# Patient Record
Sex: FEMALE | Race: AMERICAN INDIAN OR ALASKA NATIVE
[De-identification: names, ages, dates, MRNs, and addresses within clinical notes are randomized per-mention and may not be internally consistent; named-entity substitution may affect disease eponyms.]

---

## 2017-09-22 ENCOUNTER — HOSPITAL ENCOUNTER (EMERGENCY)
Dept: HOSPITAL 5 - ED | Age: 27
Discharge: HOME | End: 2017-09-22
Payer: MEDICAID

## 2017-09-22 VITALS — DIASTOLIC BLOOD PRESSURE: 62 MMHG | SYSTOLIC BLOOD PRESSURE: 110 MMHG

## 2017-09-22 DIAGNOSIS — O23.42: ICD-10-CM

## 2017-09-22 DIAGNOSIS — O21.0: Primary | ICD-10-CM

## 2017-09-22 DIAGNOSIS — Z3A.14: ICD-10-CM

## 2017-09-22 LAB
ALBUMIN SERPL-MCNC: 3.9 G/DL (ref 3.9–5)
ALBUMIN/GLOB SERPL: 1.1 %
ALP SERPL-CCNC: 68 UNITS/L (ref 35–129)
ALT SERPL-CCNC: 11 UNITS/L (ref 7–56)
ANION GAP SERPL CALC-SCNC: 21 MMOL/L
BASOPHILS NFR BLD AUTO: 0.6 % (ref 0–1.8)
BILIRUB SERPL-MCNC: 0.4 MG/DL (ref 0.1–1.2)
BILIRUB UR QL STRIP: (no result)
BLOOD UR QL VISUAL: (no result)
BUN SERPL-MCNC: 7 MG/DL (ref 7–17)
BUN/CREAT SERPL: 23.33 %
CALCIUM SERPL-MCNC: 9.2 MG/DL (ref 8.4–10.2)
CHLORIDE SERPL-SCNC: 101.1 MMOL/L (ref 98–107)
CO2 SERPL-SCNC: 18 MMOL/L (ref 22–30)
EOSINOPHIL NFR BLD AUTO: 1 % (ref 0–4.3)
GLUCOSE SERPL-MCNC: 77 MG/DL (ref 65–100)
HCT VFR BLD CALC: 38.4 % (ref 30.3–42.9)
HGB BLD-MCNC: 13.5 GM/DL (ref 10.1–14.3)
KETONES UR STRIP-MCNC: 80 MG/DL
LEUKOCYTE ESTERASE UR QL STRIP: (no result)
LIPASE SERPL-CCNC: 34 UNITS/L (ref 13–60)
MCH RBC QN AUTO: 32 PG (ref 28–32)
MCHC RBC AUTO-ENTMCNC: 35 % (ref 30–34)
MCV RBC AUTO: 92 FL (ref 79–97)
MUCOUS THREADS #/AREA URNS HPF: (no result) /HPF
NITRITE UR QL STRIP: (no result)
PH UR STRIP: 6 [PH] (ref 5–7)
PLATELET # BLD: 178 K/MM3 (ref 140–440)
POTASSIUM SERPL-SCNC: 3.8 MMOL/L (ref 3.6–5)
PROT SERPL-MCNC: 7.3 G/DL (ref 6.3–8.2)
RBC # BLD AUTO: 4.17 M/MM3 (ref 3.65–5.03)
RBC #/AREA URNS HPF: 30 /HPF (ref 0–6)
SODIUM SERPL-SCNC: 136 MMOL/L (ref 137–145)
UROBILINOGEN UR-MCNC: 2 MG/DL (ref ?–2)
WBC # BLD AUTO: 6.6 K/MM3 (ref 4.5–11)
WBC #/AREA URNS HPF: 81 /HPF (ref 0–6)

## 2017-09-22 PROCEDURE — 36415 COLL VENOUS BLD VENIPUNCTURE: CPT

## 2017-09-22 PROCEDURE — 87086 URINE CULTURE/COLONY COUNT: CPT

## 2017-09-22 PROCEDURE — 96361 HYDRATE IV INFUSION ADD-ON: CPT

## 2017-09-22 PROCEDURE — 80053 COMPREHEN METABOLIC PANEL: CPT

## 2017-09-22 PROCEDURE — 96375 TX/PRO/DX INJ NEW DRUG ADDON: CPT

## 2017-09-22 PROCEDURE — 81001 URINALYSIS AUTO W/SCOPE: CPT

## 2017-09-22 PROCEDURE — 96365 THER/PROPH/DIAG IV INF INIT: CPT

## 2017-09-22 PROCEDURE — 93010 ELECTROCARDIOGRAM REPORT: CPT

## 2017-09-22 PROCEDURE — 76805 OB US >/= 14 WKS SNGL FETUS: CPT

## 2017-09-22 PROCEDURE — 85025 COMPLETE CBC W/AUTO DIFF WBC: CPT

## 2017-09-22 PROCEDURE — 84702 CHORIONIC GONADOTROPIN TEST: CPT

## 2017-09-22 PROCEDURE — 83690 ASSAY OF LIPASE: CPT

## 2017-09-22 PROCEDURE — 99284 EMERGENCY DEPT VISIT MOD MDM: CPT

## 2017-09-22 PROCEDURE — 93005 ELECTROCARDIOGRAM TRACING: CPT

## 2017-09-22 NOTE — EMERGENCY DEPARTMENT REPORT
HPI





- General


Chief Complaint: Nausea/Vomiting/Diarrhea


Time Seen by Provider: 09/22/17 07:39





- HPI


HPI: 





Room 25





The patient is a 26-year-old female presenting with the chief complaint of 

nausea and vomiting.  The patient states past 3 days she has been vomiting and 

unable to keep anything down.  Patient is approximately 14 weeks' gestational 

age who states she's had lower abdominal pain since yesterday.  Patient denies 

fever, diarrhea or vaginal bleeding.





Location: The gastrointestinal system


Duration: 3 days


Quality: Nausea, pain


Severity: Moderate


Modifying factors: [see above]


Context: [see above]


Mode of transportation: Unknown





ED Past Medical Hx





- Past Medical History


Hx Renal Disease: Yes (kidney stones)


Hx Kidney Stones: Yes


Hx Psychiatric Treatment: Yes (depression)





- Surgical History


Additional Surgical History: pt denies





- Family History


Family history: no significant





- Social History


Smoking Status: Never Smoker


Substance Use Type: None





- Medications


Home Medications: 


 Home Medications











 Medication  Instructions  Recorded  Confirmed  Last Taken  Type


 


Acetaminophen/Codeine [Tylenol #3] 1 tab PO Q6H PRN #20 tab 10/23/15  Unknown Rx


 


Mupirocin [Bactroban 2%] 1 applic TP TID #1 tube 10/23/15  Unknown Rx


 


Sertraline [Zoloft] 50 mg PO QDAY 10/23/15 10/23/15 10/16/15 History


 


Sulfamethoxazole/Trimethoprim 1 each PO BID #20 tablet 10/23/15  Unknown Rx





[Bactrim DS TAB]     


 


medroxyPROGESTERone ACETATE 150 mg IM Q3M 10/23/15 10/23/15 08/13/15 History





[Depo-Provera (Contraception)]     


 


Nitrofurantoin Mono/M-Cryst 100 mg PO Q12HR #20 capsule 09/22/17  Unknown Rx





[Macrobid CAP]     


 


Ondansetron [Zofran ODT TAB] 8 mg PO Q8HR #20 tab.rapdis 09/22/17  Unknown Rx














ED Review of Systems


ROS: 


Stated complaint: DEHYDRATION, VOIMITING, 14 WEEKS


Other details as noted in HPI





Comment: All other systems reviewed and negative


Constitutional: denies: chills, fever


Eyes: denies: eye pain, eye discharge, vision change


ENT: denies: ear pain, throat pain


Respiratory: denies: cough, shortness of breath, wheezing


Cardiovascular: denies: chest pain, palpitations


Endocrine: no symptoms reported


Gastrointestinal: abdominal pain, nausea, vomiting.  denies: diarrhea


Genitourinary: denies: urgency, dysuria, discharge


Musculoskeletal: back pain


Skin: denies: rash, lesions


Neurological: denies: headache, weakness, paresthesias


Psychiatric: denies: anxiety, depression


Hematological/Lymphatic: denies: easy bleeding, easy bruising





Physical Exam





- Physical Exam


Vital Signs: 


 Vital Signs











  09/22/17 09/22/17





  06:20 06:21


 


Temperature 98.5 F 98.5 F


 


Pulse Rate 100 H 100 H


 


Respiratory 18 





Rate  


 


Blood Pressure 106/66 106/66


 


O2 Sat by Pulse 96 96





Oximetry  











Physical Exam: 





GENERAL: The patient is well-developed well-nourished female lying on a 

stretcher appearing to be in moderate discomfort. []


HEENT: Normocephalic.  Atraumatic.  Extraocular motions are intact.


NECK: Supple.  No meningitic signs are noted.  There is no adenopathy noted.


CHEST/LUNGS: Clear to auscultation.  There is no respiratory distress noted.


HEART/CARDIOVASCULAR: Regular.  There is no tachycardia.  There is no gallop 

rub or murmur.


ABDOMEN: Abdomen is soft, nontender.  Patient has normal bowel sounds.  There 

is no abdominal distention.


SKIN: There is no rash.  There is no edema.  There is no diaphoresis.


NEURO: The patient is awake, alert, and oriented.  The patient is cooperative. 

The patient has normal speech


MUSCULOSKELETAL: There is no evidence of acute injury.





ED Course


 Vital Signs











  09/22/17 09/22/17





  06:20 06:21


 


Temperature 98.5 F 98.5 F


 


Pulse Rate 100 H 100 H


 


Respiratory 18 





Rate  


 


Blood Pressure 106/66 106/66


 


O2 Sat by Pulse 96 96





Oximetry  














- Reevaluation(s)


Reevaluation #1: 





09/22/17 10:04


Patient tolerating po 





ED Medical Decision Making





- Lab Data


Result diagrams: 


 09/22/17 06:46





 09/22/17 06:46





 Laboratory Tests











  09/22/17 09/22/17 09/22/17





  06:46 06:46 07:55


 


WBC  6.6  


 


RBC  4.17  


 


Hgb  13.5  


 


Hct  38.4  


 


MCV  92  


 


MCH  32  


 


MCHC  35 H  


 


RDW  13.3  


 


Plt Count  178  


 


Lymph % (Auto)  19.5  


 


Mono % (Auto)  7.7 H  


 


Eos % (Auto)  1.0  


 


Baso % (Auto)  0.6  


 


Lymph #  1.3  


 


Mono #  0.5  


 


Eos #  0.1  


 


Baso #  0.0  


 


Seg Neutrophils %  71.2 H  


 


Seg Neutrophils #  4.7  


 


Sodium   136 L 


 


Potassium   3.8 


 


Chloride   101.1 


 


Carbon Dioxide   18 L 


 


Anion Gap   21 


 


BUN   7 


 


Creatinine   0.3 L 


 


Estimated GFR   > 60 


 


BUN/Creatinine Ratio   23.33 


 


Glucose   77 


 


Calcium   9.2 


 


Total Bilirubin   0.40 


 


AST   13 


 


ALT   11 


 


Alkaline Phosphatase   68 


 


Total Protein   7.3 


 


Albumin   3.9 


 


Albumin/Globulin Ratio   1.1 


 


Lipase   34 


 


HCG, Quant    43564 H


 


Urine Color   


 


Urine Turbidity   


 


Urine pH   


 


Ur Specific Gravity   


 


Urine Protein   


 


Urine Glucose (UA)   


 


Urine Ketones   


 


Urine Blood   


 


Urine Nitrite   


 


Urine Bilirubin   


 


Urine Urobilinogen   


 


Ur Leukocyte Esterase   


 


Urine WBC (Auto)   


 


Urine RBC (Auto)   


 


U Epithel Cells (Auto)   


 


Urine Mucus   














  09/22/17





  08:06


 


WBC 


 


RBC 


 


Hgb 


 


Hct 


 


MCV 


 


MCH 


 


MCHC 


 


RDW 


 


Plt Count 


 


Lymph % (Auto) 


 


Mono % (Auto) 


 


Eos % (Auto) 


 


Baso % (Auto) 


 


Lymph # 


 


Mono # 


 


Eos # 


 


Baso # 


 


Seg Neutrophils % 


 


Seg Neutrophils # 


 


Sodium 


 


Potassium 


 


Chloride 


 


Carbon Dioxide 


 


Anion Gap 


 


BUN 


 


Creatinine 


 


Estimated GFR 


 


BUN/Creatinine Ratio 


 


Glucose 


 


Calcium 


 


Total Bilirubin 


 


AST 


 


ALT 


 


Alkaline Phosphatase 


 


Total Protein 


 


Albumin 


 


Albumin/Globulin Ratio 


 


Lipase 


 


HCG, Quant 


 


Urine Color  [place]


 


Urine Turbidity  Cloudy


 


Urine pH  6.0


 


Ur Specific Gravity  1.028


 


Urine Protein  100 mg/dl


 


Urine Glucose (UA)  Neg


 


Urine Ketones  80


 


Urine Blood  Neg


 


Urine Nitrite  Neg


 


Urine Bilirubin  Neg


 


Urine Urobilinogen  2.0


 


Ur Leukocyte Esterase  Mod


 


Urine WBC (Auto)  81.0 H


 


Urine RBC (Auto)  30.0


 


U Epithel Cells (Auto)  80.0 H


 


Urine Mucus  3+














- EKG Data


-: EKG Interpreted by Me


EKG shows normal: sinus rhythm


Rate: normal





- EKG Data


When compared to previous EKG there are: no significant change


Interpretation: unchanged when compared t (06/21/2016)





- Radiology Data


Radiology results: report reviewed (pelvic ultrasound), image reviewed (pelvic 

ultrasound)





Pelvic ultrasound (read by radiologist)-single viable intrauterine gestation 

with ultrasound estimated age of 14 weeks and 5 days.





- Differential Diagnosis


hyperemesis gravidarum, UTI


Critical care attestation.: 


If time is entered above; I have spent that time in minutes in the direct care 

of this critically ill patient, excluding procedure time.








ED Disposition


Clinical Impression: 


 Hyperemesis gravidarum, UTI (urinary tract infection)





Disposition: DC-01 TO HOME OR SELFCARE


Is pt being admited?: No


Does the pt Need Aspirin: No


Condition: Stable


Instructions:  Hyperemesis Gravidarum (ED)


Additional Instructions: 


Return to the emergency department immediately should you develop worsening 

symptoms, fever, inability to tolerate food or liquid or any other concerns.


Prescriptions: 


Nitrofurantoin Mono/M-Cryst [Macrobid CAP] 100 mg PO Q12HR #20 capsule


Ondansetron [Zofran ODT TAB] 8 mg PO Q8HR #20 tab.rapdis


Referrals: 


MÓNICA JACOBO MD [Staff Physician] - 3-5 Days


Time of Disposition: 10:07

## 2018-02-22 ENCOUNTER — HOSPITAL ENCOUNTER (INPATIENT)
Dept: HOSPITAL 5 - TRG | Age: 28
LOS: 2 days | Discharge: HOME | End: 2018-02-24
Attending: OBSTETRICS & GYNECOLOGY | Admitting: OBSTETRICS & GYNECOLOGY
Payer: COMMERCIAL

## 2018-02-22 DIAGNOSIS — Z3A.36: ICD-10-CM

## 2018-02-22 DIAGNOSIS — Z23: ICD-10-CM

## 2018-02-22 DIAGNOSIS — Z88.8: ICD-10-CM

## 2018-02-22 DIAGNOSIS — B00.9: ICD-10-CM

## 2018-02-22 DIAGNOSIS — Z3A.37: ICD-10-CM

## 2018-02-22 LAB
HCT VFR BLD CALC: 30.9 % (ref 30.3–42.9)
HGB BLD-MCNC: 10.2 GM/DL (ref 10.1–14.3)
MCH RBC QN AUTO: 27 PG (ref 28–32)
MCHC RBC AUTO-ENTMCNC: 33 % (ref 30–34)
MCV RBC AUTO: 82 FL (ref 79–97)
PLATELET # BLD: 154 K/MM3 (ref 140–440)
RBC # BLD AUTO: 3.78 M/MM3 (ref 3.65–5.03)

## 2018-02-22 PROCEDURE — 0HQ9XZZ REPAIR PERINEUM SKIN, EXTERNAL APPROACH: ICD-10-PCS | Performed by: OBSTETRICS & GYNECOLOGY

## 2018-02-22 PROCEDURE — 85018 HEMOGLOBIN: CPT

## 2018-02-22 PROCEDURE — 86901 BLOOD TYPING SEROLOGIC RH(D): CPT

## 2018-02-22 PROCEDURE — A6250 SKIN SEAL PROTECT MOISTURIZR: HCPCS

## 2018-02-22 PROCEDURE — 86592 SYPHILIS TEST NON-TREP QUAL: CPT

## 2018-02-22 PROCEDURE — 86850 RBC ANTIBODY SCREEN: CPT

## 2018-02-22 PROCEDURE — 85027 COMPLETE CBC AUTOMATED: CPT

## 2018-02-22 PROCEDURE — 36415 COLL VENOUS BLD VENIPUNCTURE: CPT

## 2018-02-22 PROCEDURE — 00HU33Z INSERTION OF INFUSION DEVICE INTO SPINAL CANAL, PERCUTANEOUS APPROACH: ICD-10-PCS | Performed by: OBSTETRICS & GYNECOLOGY

## 2018-02-22 PROCEDURE — 86900 BLOOD TYPING SEROLOGIC ABO: CPT

## 2018-02-22 PROCEDURE — G0463 HOSPITAL OUTPT CLINIC VISIT: HCPCS

## 2018-02-22 PROCEDURE — 3E0R3BZ INTRODUCTION OF ANESTHETIC AGENT INTO SPINAL CANAL, PERCUTANEOUS APPROACH: ICD-10-PCS | Performed by: OBSTETRICS & GYNECOLOGY

## 2018-02-22 PROCEDURE — 85014 HEMATOCRIT: CPT

## 2018-02-22 PROCEDURE — 3E0234Z INTRODUCTION OF SERUM, TOXOID AND VACCINE INTO MUSCLE, PERCUTANEOUS APPROACH: ICD-10-PCS | Performed by: OBSTETRICS & GYNECOLOGY

## 2018-02-22 PROCEDURE — 99211 OFF/OP EST MAY X REQ PHY/QHP: CPT

## 2018-02-22 RX ADMIN — OXYTOCIN SCH MLS/HR: 10 INJECTION, SOLUTION INTRAMUSCULAR; INTRAVENOUS at 11:26

## 2018-02-22 RX ADMIN — OXYTOCIN SCH MLS/HR: 10 INJECTION, SOLUTION INTRAMUSCULAR; INTRAVENOUS at 12:47

## 2018-02-22 RX ADMIN — WITCH HAZEL PRN EACH: 500 SOLUTION RECTAL; TOPICAL at 18:47

## 2018-02-22 RX ADMIN — OXYTOCIN SCH MLS/HR: 10 INJECTION, SOLUTION INTRAMUSCULAR; INTRAVENOUS at 12:12

## 2018-02-22 RX ADMIN — IBUPROFEN SCH MG: 600 TABLET, FILM COATED ORAL at 18:05

## 2018-02-22 RX ADMIN — DOCUSATE SODIUM SCH MG: 100 CAPSULE, LIQUID FILLED ORAL at 21:54

## 2018-02-22 NOTE — EVENT NOTE
Date: 18


Patient was noted to be 3cm and srom with clear fluid. I used a sterile 

speculum and noted that cerclage visualized knot and grasped and removed after 

cutting entire suture. She was noted to be 4/100/-1 with bulging bag. Will give 

BMZ and antibiotics for . patient tolerated procedure well. will expect 

vaginal delivery.

## 2018-02-22 NOTE — PROCEDURE NOTE
OB Delivery Note





- Delivery


Date of Delivery: 18


Surgeon: MÓNICA JACOBO


Estimated blood loss: 200cc





- Vaginal


Delivery presentation: vertex


Delivery position: OA


Intrapartum events: none


Delivery induction: none


Delivery augmentation: rupture of membranes, pitocin


Delivery monitor: external FHT, external uterine


Route of delivery: 


Delivery placenta: spontaneous


Delivery cord: 3 umbilical vessels


Episiotomy: none


Delivery laceration: 1st degree


Delivery repair: vicryl


Anesthesia: epidural


Delivery comments: 


Patient progressed to C/C/+2 and pushed to deliver a liveborn Male infant with 

apgars of 8/9  @ 1358. After delivery of the head, the shoulders delivered 

easily. The cord was clamped and cut and the infant was placed on the patient's 

abdomen. The placenta delivered spontaneously intact with a 3VC. First degree 

perineum lac repaired with 2-0 vicryl. Weight 5 lbs 13oz. EBL 200cc.








- Infant


  ** A


Apgar at 1 minute: 8


Apgar at 5 minutes: 9


Infant Gender: Male

## 2018-02-22 NOTE — EVENT NOTE
Date: 02/22/18





Patient was noted to have a forebag and was arom clear scant fluid. rechecked 

cervix 4-5/100/-1. pitocin previously initiated. will proceed with high does 

epitocin tracing cat 1. GBS confirmed neg. expect vaginal delivery

## 2018-02-22 NOTE — HISTORY AND PHYSICAL REPORT
History of Present Illness


Date of examination: 18


Date of admission: 





18


Chief complaint: 





broke my water


History of present illness: 


This is a 26 yo  at 








Past History


Past Medical History: no pertinent history


Past Surgical History: other (cerclage)


GYN History: herpes


Family/Genetic History: diabetes, hypertension


Social history: single.  denies: smoking, alcohol abuse, prescription drug abuse





- Obstetrical History


Expected Date of Delivery: 18


Actual Gestation: 36 Week(s) 5 Day(s) 


: 3


Para: 2


Hx # Term Pregnancies: 1


Number of  Pregnancies: 1


Spontaneous Abortions: 0


Induced : 0


Number of Living Children: 1





Medications and Allergies


 Allergies











Allergy/AdvReac Type Severity Reaction Status Date / Time


 


Citrus And Derivatives AdvReac  Anaphylaxis Verified 18 05:15











 Home Medications











 Medication  Instructions  Recorded  Confirmed  Last Taken  Type


 


Acetaminophen [Tylenol Extra 1,000 mg PO Q6H PRN 17 

History





Strength]     











Active Meds: 


Active Medications





Ampicillin Sodium (Polycillin/Ns 2 Gm/100 Ml)  2 gm in 100 mls @ 100 mls/hr IV 

ONCE MELBA


   PRN Reason: Protocol


Lactated Ringer's (Lactated Ringers)  1,000 mls @ 125 mls/hr IV AS DIRECT MELBA











Review of Systems


All systems: negative


Genitourinary: leakage of fluid





- Vital Signs


Vital signs: 


 Vital Signs











Temp Resp


 


 97.9 F   18 


 


 18 05:15  18 05:15








 











Temp Pulse Resp BP Pulse Ox


 


 97.9 F   91 H  18      97 


 


 18 05:15  18 06:33  18 05:15     18 06:33














- Physical Exam


Cardiovascular: Regular rate, Normal S1


Lungs: Positive: Clear to auscultation


Abdomen: Positive: normal bowel sounds.  Negative: distention, tenderness, 

guarding


Genitourinary (Female): Positive: normal external genitalia, normal perenium


Vulva: both: normal


Vagina: Positive: normal moisture


Cervix: Positive: other (cerclage).  Negative: lesion, ulceration (cerclage)


Uterus: Positive: normal size


Anus/Rectum: Positive: normal perianal skin


Deep Tendon Reflex Grade: Normal +2





- Obstetrical


FHR: category 1


Cervical Dilatation: 3


Uterine Contraction Pattern: Irregular


Uterine Tone Measurement Phase: Contraction





Results


All other labs normal.








Assessment and Plan


A/P IUP 36+5 weeks 


       s/p cerclage - will remove 


       BMZ for  labor 


       expect vaginal delivery

## 2018-02-23 LAB
HCT VFR BLD CALC: 28.5 % (ref 30.3–42.9)
HGB BLD-MCNC: 9.4 GM/DL (ref 10.1–14.3)

## 2018-02-23 RX ADMIN — DOCUSATE SODIUM SCH MG: 100 CAPSULE, LIQUID FILLED ORAL at 10:44

## 2018-02-23 RX ADMIN — BENZOCAINE AND LEVOMENTHOL PRN SPRAY: 200; 5 SPRAY TOPICAL at 12:35

## 2018-02-23 RX ADMIN — IBUPROFEN SCH MG: 600 TABLET, FILM COATED ORAL at 10:44

## 2018-02-23 RX ADMIN — Medication SCH EACH: at 10:43

## 2018-02-23 RX ADMIN — HYDROCODONE BITARTRATE AND ACETAMINOPHEN PRN EACH: 5; 325 TABLET ORAL at 19:03

## 2018-02-23 RX ADMIN — IBUPROFEN SCH MG: 600 TABLET, FILM COATED ORAL at 18:50

## 2018-02-23 RX ADMIN — IBUPROFEN SCH MG: 600 TABLET, FILM COATED ORAL at 02:01

## 2018-02-23 RX ADMIN — HYDROCODONE BITARTRATE AND ACETAMINOPHEN PRN EACH: 5; 325 TABLET ORAL at 10:47

## 2018-02-23 RX ADMIN — DOCUSATE SODIUM SCH: 100 CAPSULE, LIQUID FILLED ORAL at 23:40

## 2018-02-23 NOTE — PROGRESS NOTE
Assessment and Plan





A/P PPD # 1 s/p  


     doing well 


     no complaints


     h/h 10.2/30.9---9.4/28.5 on iron 


     continue postpartum course 


     discharge home tomorrow with f/u in 4 weeks 


     to call clinic for circumcision





Subjective





- Subjective


Date of service: 18


Principal diagnosis: s/p 


Interval history: 


This is a 26 yo  at 





Patient reports: appetite normal, voiding normally, pain well controlled, flatus

, ambulating normally


[place]: doing well





Objective





- Vital Signs


Latest vital signs: 


 Vital Signs











  Temp Pulse Resp BP BP Pulse Ox


 


 18 10:47    18   


 


 18 10:44    18   


 


 18 07:43  98.3 F  67  18   102/56  98


 


 18 03:30  96.7 F L  76    110/70 


 


 18 00:50  98.0 F  75  20   104/61  99


 


 18 16:45    20   


 


 18 16:15  98.6 F  68  16   106/61 


 


 18 15:54   70   93/60  


 


 18 15:38   71   93/54  


 


 18 15:23   75   106/61  


 


 18 15:08   80   109/63  


 


 18 14:54   80   109/58  


 


 18 14:39   89   108/57  








 Intake and Output











 18





 23:59 07:59 15:59


 


Intake Total 240 240 


 


Balance 240 240 


 


Intake:   


 


  Oral 240 240 


 


Other:   


 


  Total, Intake Amount 240 120 


 


  # Voids   


 


    Void 1 1 


 


  Estimated Blood Loss 200  














- Exam


Breasts: Present: normal


Cardiovascular: Present: Regular rate, Normal S1


Lungs: Present: Clear to auscultation, Normal air movement


Abdomen: Present: normal appearance, soft, normal bowel sounds.  Absent: 

distention, tenderness, guarding


Vulva: both: normal


Uterus: Present: normal, firm, fundal height below umbilicus.  Absent: bogginess

, tenderness


Extremities: Present: normal


Deep Tendon Reflex Grade: Normal +2





- Labs


Labs: 


 Abnormal lab results











  18 Range/Units





  01:14 


 


Hgb  9.4 L  (10.1-14.3)  gm/dl


 


Hct  28.5 L  (30.3-42.9)  %

## 2018-02-24 VITALS — SYSTOLIC BLOOD PRESSURE: 108 MMHG | DIASTOLIC BLOOD PRESSURE: 63 MMHG

## 2018-02-24 RX ADMIN — Medication SCH EACH: at 10:41

## 2018-02-24 RX ADMIN — IBUPROFEN SCH MG: 600 TABLET, FILM COATED ORAL at 00:53

## 2018-02-24 RX ADMIN — IBUPROFEN SCH MG: 600 TABLET, FILM COATED ORAL at 08:38

## 2018-02-24 RX ADMIN — DOCUSATE SODIUM SCH MG: 100 CAPSULE, LIQUID FILLED ORAL at 10:41

## 2018-02-24 RX ADMIN — WITCH HAZEL PRN EACH: 500 SOLUTION RECTAL; TOPICAL at 10:41

## 2018-02-24 RX ADMIN — BENZOCAINE AND LEVOMENTHOL PRN SPRAY: 200; 5 SPRAY TOPICAL at 10:41

## 2018-02-24 NOTE — PROGRESS NOTE
Assessment and Plan





- Patient Problems


(1)  delivery


Current Visit: Yes   Status: Acute   


Plan to address problem: 


Patient doing well


Discharge home








Subjective





- Subjective


Date of service: 18


Principal diagnosis: s/p 


Interval history: 


Patient is without complaints.  She is tolerating regular diet.  Her pain is 

well-controlled.





Patient reports: appetite normal, voiding normally, pain well controlled


: in NICU





Objective





- Vital Signs


Latest vital signs: 


 Vital Signs











  Temp Resp Pulse Ox


 


 18 01:53   16 


 


 18 00:55  98 F  18  98


 


 18 00:53   16 


 


 18 19:50   16 


 


 18 19:03   18 


 


 18 10:47   18 


 


 18 10:44   18 








 Intake and Output











 18





 22:59 06:59 14:59


 


Intake Total  240 


 


Balance  240 


 


Intake:   


 


  Oral  240 


 


Other:   


 


  Total, Intake Amount  240 


 


  # Voids   


 


    Void  1 














- Exam


Uterus: Present: normal, firm

## 2018-02-24 NOTE — DISCHARGE SUMMARY
Providers





- Providers


Date of Admission: 


18 06:53





Date of discharge: 18


Attending physician: 


MÓNICA JACOBO MD





Primary care physician: 


MÓNICA JACOBO MD








Hospitalization


Reason for admission:  labor, rupture of membranes


Delivery: 


Procedure: other (removal of cerclage)


Discharge diagnosis:  delivery


 baby: male


Hospital course: 


The patient admitted with gross rupture membranes.  It was noted that her 

cerclage still in place.  The patient had a successful vaginal delivery.  

Postpartum course was uncomplicated.





Condition at discharge: Good


Disposition: DC-01 TO HOME OR SELFCARE





- Discharge Diagnoses


(1)  delivery


Status: Acute   





Plan





- Discharge Medications


Prescriptions: 


Docusate Sodium [Colace] 100 mg PO BID PRN #30 capsule


 PRN Reason: Constipation


Ferrous Sulfate 325 mg PO BID #60 tablet.


HYDROcodone/ACETAMINOPHEN [Norco 5-325 Tablet] 1 each PO Q6HR #30 tablet


Ibuprofen [Motrin] 600 mg PO Q8H PRN #30 tablet


 PRN Reason: Pain





- Provider Discharge Summary


Activity: no sex for 6 weeks, no heavy lifting 4 weeks, no strenuous exercise


Diet: routine


Instructions: routine


Additional instructions: 


[]  Smoking cessation referral if applicable(refer to patient education folder 

for contact #)


[]  Refer to [facility] Booklet








Call your doctor immediately for:


* Fever > 100.5


* Heavy vaginal bleeding ( >1 pad per hour)


* Severe persistent headache


* Shortness of breath


* Reddened, hot, painful area to leg or breast


* Follow-up 4 weeks postpartum





- Follow up plan

## 2019-06-13 ENCOUNTER — HOSPITAL ENCOUNTER (EMERGENCY)
Dept: HOSPITAL 5 - ED | Age: 29
Discharge: HOME | End: 2019-06-13
Payer: COMMERCIAL

## 2019-06-13 VITALS — SYSTOLIC BLOOD PRESSURE: 112 MMHG | DIASTOLIC BLOOD PRESSURE: 59 MMHG

## 2019-06-13 DIAGNOSIS — O99.331: ICD-10-CM

## 2019-06-13 DIAGNOSIS — O21.0: Primary | ICD-10-CM

## 2019-06-13 DIAGNOSIS — Z3A.01: ICD-10-CM

## 2019-06-13 DIAGNOSIS — Z91.018: ICD-10-CM

## 2019-06-13 DIAGNOSIS — F12.10: ICD-10-CM

## 2019-06-13 DIAGNOSIS — E86.0: ICD-10-CM

## 2019-06-13 LAB
BASOPHILS # (AUTO): 0 K/MM3 (ref 0–0.1)
BASOPHILS NFR BLD AUTO: 0.7 % (ref 0–1.8)
BILIRUB UR QL STRIP: (no result)
BLOOD UR QL VISUAL: (no result)
BUN SERPL-MCNC: 8 MG/DL (ref 7–17)
BUN/CREAT SERPL: 16 %
CALCIUM SERPL-MCNC: 9.1 MG/DL (ref 8.4–10.2)
EOSINOPHIL # BLD AUTO: 0 K/MM3 (ref 0–0.4)
EOSINOPHIL NFR BLD AUTO: 0.7 % (ref 0–4.3)
HCT VFR BLD CALC: 40 % (ref 30.3–42.9)
HEMOLYSIS INDEX: 42
HGB BLD-MCNC: 13.1 GM/DL (ref 10.1–14.3)
LYMPHOCYTES # BLD AUTO: 1.6 K/MM3 (ref 1.2–5.4)
LYMPHOCYTES NFR BLD AUTO: 26.7 % (ref 13.4–35)
MCHC RBC AUTO-ENTMCNC: 33 % (ref 30–34)
MCV RBC AUTO: 96 FL (ref 79–97)
MONOCYTES # (AUTO): 0.5 K/MM3 (ref 0–0.8)
MONOCYTES % (AUTO): 8.7 % (ref 0–7.3)
MUCOUS THREADS #/AREA URNS HPF: (no result) /HPF
PH UR STRIP: 7 [PH] (ref 5–7)
PLATELET # BLD: 175 K/MM3 (ref 140–440)
PROT UR STRIP-MCNC: (no result) MG/DL
RBC # BLD AUTO: 4.15 M/MM3 (ref 3.65–5.03)
RBC #/AREA URNS HPF: 5 /HPF (ref 0–6)
UROBILINOGEN UR-MCNC: < 2 MG/DL (ref ?–2)
WBC #/AREA URNS HPF: 2 /HPF (ref 0–6)

## 2019-06-13 PROCEDURE — 99283 EMERGENCY DEPT VISIT LOW MDM: CPT

## 2019-06-13 PROCEDURE — 84702 CHORIONIC GONADOTROPIN TEST: CPT

## 2019-06-13 PROCEDURE — 96361 HYDRATE IV INFUSION ADD-ON: CPT

## 2019-06-13 PROCEDURE — 96374 THER/PROPH/DIAG INJ IV PUSH: CPT

## 2019-06-13 PROCEDURE — 81001 URINALYSIS AUTO W/SCOPE: CPT

## 2019-06-13 PROCEDURE — 80048 BASIC METABOLIC PNL TOTAL CA: CPT

## 2019-06-13 PROCEDURE — 36415 COLL VENOUS BLD VENIPUNCTURE: CPT

## 2019-06-13 PROCEDURE — 85025 COMPLETE CBC W/AUTO DIFF WBC: CPT

## 2019-06-13 NOTE — EMERGENCY DEPARTMENT REPORT
Vomiting/Diarrhea





- HPI


Chief Complaint: Nausea/Vomiting/Diarrhea


Stated Complaint: PREGNANT/NAUSEA/DEHYDRATION


Time Seen by Provider: 19 13:37


Duration: 1 week


Severity: moderate


Nausea/Vomiting Severity: Moderate


Diarrhea Severity: None


Pain Severity: None


Symptoms: Yes Able to Tolerate Fluids, No Watery Diarrhea, No Bloody diarrhea, 

No Fever, No Recent Unusual Foods, No Recent Untreated Water, No Recent use of 

Antibiotics, No Family w/ Similar Symptoms, No Contacts w/ Similar Symptoms, No 

Rash, No Hematuria, No Recent URI Symptoms


Other History: This is a 28-year-old -American female who presents to 

emergency room with nausea and vomiting for one week.  Patient states she 

recently found out she was pregnant.  Last menstrual period was 2019,  A1 one miscarriage.  Patient states she does not have an OB/GYN yet.  She 

denies abdominal pain, vaginal discharge, vaginal bleeding, urinary frequency, 

urgency, dysuria, dizziness, or back pain.





ED Review of Systems


ROS: 


Stated complaint: PREGNANT/NAUSEA/DEHYDRATION


Other details as noted in HPI





Constitutional: denies: chills, fever


Respiratory: denies: cough, shortness of breath, wheezing


Cardiovascular: denies: chest pain, palpitations


Gastrointestinal: nausea, vomiting.  denies: abdominal pain, diarrhea


Musculoskeletal: denies: back pain, joint swelling, arthralgia


Skin: denies: rash, lesions


Neurological: denies: headache, weakness, paresthesias


Psychiatric: denies: anxiety, depression





ED Past Medical Hx





- Past Medical History


Hx Hypertension: No


Hx Congestive Heart Failure: No


Hx Diabetes: No


Hx Deep Vein Thrombosis: No


Hx Renal Disease: No (kidney stones)


Hx Sickle Cell Disease: No


Hx Seizures: No


Hx Kidney Stones: Yes


Hx Psychiatric Treatment: Yes (depression)


Hx Asthma: No


Hx COPD: No


Hx HIV: No





- Surgical History


Additional Surgical History: pt denies





- Social History


Smoking Status: Current Every Day Smoker


Substance Use Type: Alcohol, Marijuana





- Medications


Home Medications: 


                                Home Medications











 Medication  Instructions  Recorded  Confirmed  Last Taken  Type


 


Acetaminophen [Tylenol Extra 1,000 mg PO Q6H PRN 17 

History





Strength]     


 


Docusate Sodium [Colace] 100 mg PO BID PRN #30 capsule 18  Unknown Rx


 


Ferrous Sulfate 325 mg PO BID #60 tablet. 18  Unknown Rx


 


HYDROcodone/ACETAMINOPHEN [[place] 1 each PO Q6HR #30 tablet 18  Unknown Rx





5-325 Tablet]     


 


Ibuprofen [Motrin] 600 mg PO Q8H PRN #30 tablet 18  Unknown Rx


 


Doxylamine Succinate [Unisom] 12.5 mg PO TID PRN #30 tablet 19  Unknown Rx


 


Pyridoxine HCl (Vitamin B6) 25 mg PO TID PRN #30 tablet 19  Unknown Rx





[Pyridoxine HCl]     














Vomiting Diarrhea Exam





- Exam


General: 


Vital signs noted. No distress. Alert and acting appropriately.





HEENT: Yes Pharyngeal Erythema (erythematous posterior pharynx, uvula midline), 

Yes Moist Mucous Membranes, No Pharyngeal Exudates, No Rhinorrhea, No 

Conjuctival Injection, No Frontal Tenderness, No Maxillary Tenderness


Neck: No Adenopathy, No Rigidity


Lungs: Yes Clear Lung Sounds, Yes Good Air Exchange, No Wheezes, No Stridor, No 

Cough, No Nasal Flaring, No Retractions, No Use of Accessory Muscles


Heart exam: Regular: Yes, Murmur: No, Tachycardia: No


Abdomen: Tenderness: No, Peritoneal Signs: No, Distention: No, Hyperactive Bowel

sounds: No


Skin exam: Rash: No, Edema: No, Normal turgor: Yes


Neurologic: 


Alert and oriented, no deficits.








Musculoskeletal: 


Unremarkable.











ED Course


                                   Vital Signs











  19





  13:34


 


Temperature 98.4 F


 


Pulse Rate 66


 


Respiratory 18





Rate 


 


Blood Pressure 124/68


 


O2 Sat by Pulse 100





Oximetry 














ED Medical Decision Making





- Lab Data


Result diagrams: 


                                 19 14:14





                                 19 14:14





                                   Lab Results











  19 Range/Units





  14:14 14:14 14:14 


 


WBC  6.1    (4.5-11.0)  K/mm3


 


RBC  4.15    (3.65-5.03)  M/mm3


 


Hgb  13.1    (10.1-14.3)  gm/dl


 


Hct  40.0    (30.3-42.9)  %


 


MCV  96    (79-97)  fl


 


MCH  32    (28-32)  pg


 


MCHC  33    (30-34)  %


 


RDW  13.7    (13.2-15.2)  %


 


Plt Count  175    (140-440)  K/mm3


 


Lymph % (Auto)  26.7    (13.4-35.0)  %


 


Mono % (Auto)  8.7 H    (0.0-7.3)  %


 


Eos % (Auto)  0.7    (0.0-4.3)  %


 


Baso % (Auto)  0.7    (0.0-1.8)  %


 


Lymph #  1.6    (1.2-5.4)  K/mm3


 


Mono #  0.5    (0.0-0.8)  K/mm3


 


Eos #  0.0    (0.0-0.4)  K/mm3


 


Baso #  0.0    (0.0-0.1)  K/mm3


 


Seg Neutrophils %  63.2    (40.0-70.0)  %


 


Seg Neutrophils #  3.8    (1.8-7.7)  K/mm3


 


Sodium   136 L   (137-145)  mmol/L


 


Potassium   4.1   (3.6-5.0)  mmol/L


 


Chloride   104.4   ()  mmol/L


 


Carbon Dioxide   20 L   (22-30)  mmol/L


 


Anion Gap   16   mmol/L


 


BUN   8   (7-17)  mg/dL


 


Creatinine   0.5 L   (0.7-1.2)  mg/dL


 


Estimated GFR   > 60   ml/min


 


BUN/Creatinine Ratio   16   %


 


Glucose   74   ()  mg/dL


 


Calcium   9.1   (8.4-10.2)  mg/dL


 


HCG, Quant    15509 H  (0-4)  mIU/mL


 


Urine Color     (Yellow)  


 


Urine Turbidity     (Clear)  


 


Urine pH     (5.0-7.0)  


 


Ur Specific Gravity     (1.003-1.030)  


 


Urine Protein     (Negative)  mg/dL


 


Urine Glucose (UA)     (Negative)  mg/dL


 


Urine Ketones     (Negative)  mg/dL


 


Urine Blood     (Negative)  


 


Urine Nitrite     (Negative)  


 


Urine Bilirubin     (Negative)  


 


Urine Urobilinogen     (<2.0)  mg/dL


 


Ur Leukocyte Esterase     (Negative)  


 


Urine WBC (Auto)     (0.0-6.0)  /HPF


 


Urine RBC (Auto)     (0.0-6.0)  /HPF


 


U Epithel Cells (Auto)     (0-13.0)  /HPF


 


Urine Mucus     /HPF














  19 Range/Units





  Unknown 


 


WBC   (4.5-11.0)  K/mm3


 


RBC   (3.65-5.03)  M/mm3


 


Hgb   (10.1-14.3)  gm/dl


 


Hct   (30.3-42.9)  %


 


MCV   (79-97)  fl


 


MCH   (28-32)  pg


 


MCHC   (30-34)  %


 


RDW   (13.2-15.2)  %


 


Plt Count   (140-440)  K/mm3


 


Lymph % (Auto)   (13.4-35.0)  %


 


Mono % (Auto)   (0.0-7.3)  %


 


Eos % (Auto)   (0.0-4.3)  %


 


Baso % (Auto)   (0.0-1.8)  %


 


Lymph #   (1.2-5.4)  K/mm3


 


Mono #   (0.0-0.8)  K/mm3


 


Eos #   (0.0-0.4)  K/mm3


 


Baso #   (0.0-0.1)  K/mm3


 


Seg Neutrophils %   (40.0-70.0)  %


 


Seg Neutrophils #   (1.8-7.7)  K/mm3


 


Sodium   (137-145)  mmol/L


 


Potassium   (3.6-5.0)  mmol/L


 


Chloride   ()  mmol/L


 


Carbon Dioxide   (22-30)  mmol/L


 


Anion Gap   mmol/L


 


BUN   (7-17)  mg/dL


 


Creatinine   (0.7-1.2)  mg/dL


 


Estimated GFR   ml/min


 


BUN/Creatinine Ratio   %


 


Glucose   ()  mg/dL


 


Calcium   (8.4-10.2)  mg/dL


 


HCG, Quant   (0-4)  mIU/mL


 


Urine Color  [place]  (Yellow)  


 


Urine Turbidity  Turbid  (Clear)  


 


Urine pH  7.0  (5.0-7.0)  


 


Ur Specific Gravity  1.020  (1.003-1.030)  


 


Urine Protein  <15 mg/dl  (Negative)  mg/dL


 


Urine Glucose (UA)  Neg  (Negative)  mg/dL


 


Urine Ketones  20  (Negative)  mg/dL


 


Urine Blood  Neg  (Negative)  


 


Urine Nitrite  Neg  (Negative)  


 


Urine Bilirubin  Neg  (Negative)  


 


Urine Urobilinogen  < 2.0  (<2.0)  mg/dL


 


Ur Leukocyte Esterase  Neg  (Negative)  


 


Urine WBC (Auto)  2.0  (0.0-6.0)  /HPF


 


Urine RBC (Auto)  5.0  (0.0-6.0)  /HPF


 


U Epithel Cells (Auto)  1.0  (0-13.0)  /HPF


 


Urine Mucus  3+  /HPF














- Medical Decision Making





Patient was examined by me.  Vitals are normal and patient is in no acute 

distress.  Obtain labs.  IV site initiated.  Patient was given normal saline 1 L

IV bolus and Zofran 4 mg IV.  By mouth challenge tolerated and patient reports 

feeling better.  He denies abdominal pain, vaginal discharge or bleeding.  Start

pyridoxine and doxylamine for hyperemesis.  Femoral to OB/GYN for continued 

care.  Plan discussed with patient to discharge home and treat outpatient. She 

agrees with ER plan.   Patient discharged home in stable condition. Follow up 

with PCP in 2-3 days.


Critical care attestation.: 


If time is entered above; I have spent that time in minutes in the direct care 

of this critically ill patient, excluding procedure time.








ED Disposition


Clinical Impression: 


 Nausea and vomiting in pregnancy, Hyperemesis gravidarum





Disposition:  TO HOME OR SELFCARE


Is pt being admited?: No


Does the pt Need Aspirin: No


Condition: Stable


Instructions:  Acute Nausea and Vomiting (ED), Hyperemesis Gravidarum (ED)


Additional Instructions: 


Take medication as prescribed to prevent symptoms.  Follow up with OB/GYN from 

the referrals below.


Prescriptions: 


Pyridoxine HCl (Vitamin B6) [Pyridoxine HCl] 25 mg PO TID PRN #30 tablet


 PRN Reason: Nausea


Doxylamine Succinate [Unisom] 12.5 mg PO TID PRN #30 tablet


 PRN Reason: Nausea


Referrals: 


JEFFERSON TRISTAN MD [Primary Care Provider] - 3-5 Days


[facility] [Provider Group] - 3-5 Days


[facility] [Provider Group] - 3-5 Days


[facility] [Provider Group] - 3-5 Days


Forms:  Work/School Release Form(ED)


Time of Disposition: 18:22

## 2019-06-13 NOTE — EMERGENCY DEPARTMENT REPORT
Blank Doc





- Documentation


Documentation: 





This is a 28-year-old female that presents with n/v.  Stated is pregnant but is 

not sure how long.  Denies vaginal bleeding or pain.





This initial assessment/diagnostic orders/clinical plan/treatment(s) is/are 

subject to change based on patient's health status, clinical progression and re-

assessment by fellow clinical providers in the ED.  Further treatment and workup

at subsequent clinical providers discretion.  Patient/guardians urged not to 

elope from the ED as their condition may be serious if not clinically assessed 

and managed.  Initial orders include:


1- Patient sent to [facility] for further evaluation and treatment


2- labs


3- UA

## 2022-07-18 ENCOUNTER — HOSPITAL ENCOUNTER (OUTPATIENT)
Dept: HOSPITAL 5 - TRG | Age: 32
Discharge: HOME | End: 2022-07-18
Attending: OBSTETRICS & GYNECOLOGY
Payer: COMMERCIAL

## 2022-07-18 VITALS — SYSTOLIC BLOOD PRESSURE: 110 MMHG | DIASTOLIC BLOOD PRESSURE: 76 MMHG

## 2022-07-18 DIAGNOSIS — Z3A.35: ICD-10-CM

## 2022-07-18 PROCEDURE — 84112 EVAL AMNIOTIC FLUID PROTEIN: CPT

## 2022-07-18 PROCEDURE — 36415 COLL VENOUS BLD VENIPUNCTURE: CPT

## 2024-07-28 NOTE — ULTRASOUND REPORT
OB ULTRASOUND GREATER THAN 14 WEEKS



INDICATION: Lower abdominal pain.



COMPARISON: None similar at this institution.



TECHNIQUE: Transabdominal grayscale ultrasound with Doppler 

interrogation.



Gestation: [place]



Fetal Position: Breech



Amniotic Fluid: WNL (< 24 weeks, subjective)

  

Placenta: Posterior, fundal

  Placental Grade: 0



Fetal Heart Rate:

  155 BPM



Cervical length: 3 cm (Normal > 3 cm)



It is too early for a fetal anatomical survey



 BPD: 2.8 cm = 15 w 0 d



 HC: 10.5 cm = 15 w 0 d



 AC: 8.7 cm = 15 w 0 d



 FL: 1.4 cm = 14 w 0 d



HC/AC Ratio: 1.21



Cephalic Index: 84.7



LMP: 6/10/2017



Clinical age = 14 w 6 d      EDC: 03/17/2018



US Gest. Age = 14 w 5 d      EDC: 03/18/2018 





CONCLUSION: Single, viable intrauterine gestation with ultrasound 

estimated age of 14 weeks and 5 days and EDC of 03/18/2018, currently 

in breech lie with details, as above.



Thank you for the opportunity to participate in this patient's care. Performed Resulted